# Patient Record
Sex: FEMALE | URBAN - METROPOLITAN AREA
[De-identification: names, ages, dates, MRNs, and addresses within clinical notes are randomized per-mention and may not be internally consistent; named-entity substitution may affect disease eponyms.]

---

## 2018-02-15 ENCOUNTER — APPOINTMENT (RX ONLY)
Dept: URBAN - METROPOLITAN AREA CLINIC 84 | Facility: CLINIC | Age: 83
Setting detail: DERMATOLOGY
End: 2018-02-15

## 2018-02-15 PROBLEM — L57.0 ACTINIC KERATOSIS: Status: ACTIVE | Noted: 2018-02-15

## 2018-02-15 PROBLEM — Z85.79 PERSONAL HISTORY OF OTHER MALIGNANT NEOPLASMS OF LYMPHOID, HEMATOPOIETIC AND RELATED TISSUES: Status: ACTIVE | Noted: 2018-02-15

## 2018-02-15 PROBLEM — C44.91 BASAL CELL CARCINOMA OF SKIN, UNSPECIFIED: Status: ACTIVE | Noted: 2018-02-15

## 2018-02-15 PROBLEM — D04.39 CARCINOMA IN SITU OF SKIN OF OTHER PARTS OF FACE: Status: ACTIVE | Noted: 2018-02-15

## 2018-02-15 PROBLEM — M12.9 ARTHROPATHY, UNSPECIFIED: Status: ACTIVE | Noted: 2018-02-15

## 2018-02-15 PROBLEM — E78.5 HYPERLIPIDEMIA, UNSPECIFIED: Status: ACTIVE | Noted: 2018-02-15

## 2018-02-15 PROBLEM — Z92.3 PERSONAL HISTORY OF IRRADIATION: Status: ACTIVE | Noted: 2018-02-15

## 2018-02-15 PROBLEM — Z85.828 PERSONAL HISTORY OF OTHER MALIGNANT NEOPLASM OF SKIN: Status: ACTIVE | Noted: 2018-02-15

## 2018-02-15 PROBLEM — H91.90 UNSPECIFIED HEARING LOSS, UNSPECIFIED EAR: Status: ACTIVE | Noted: 2018-02-15

## 2018-02-15 PROCEDURE — ? DEFER

## 2018-02-15 PROCEDURE — 99213 OFFICE O/P EST LOW 20 MIN: CPT

## 2018-03-07 ENCOUNTER — APPOINTMENT (RX ONLY)
Dept: URBAN - METROPOLITAN AREA CLINIC 84 | Facility: CLINIC | Age: 83
Setting detail: DERMATOLOGY
End: 2018-03-07

## 2018-03-07 PROBLEM — D48.5 NEOPLASM OF UNCERTAIN BEHAVIOR OF SKIN: Status: ACTIVE | Noted: 2018-03-07

## 2018-03-07 PROCEDURE — 11100: CPT

## 2018-03-07 PROCEDURE — ? BIOPSY BY SHAVE METHOD

## 2018-03-26 ENCOUNTER — APPOINTMENT (RX ONLY)
Dept: URBAN - METROPOLITAN AREA CLINIC 84 | Facility: CLINIC | Age: 83
Setting detail: DERMATOLOGY
End: 2018-03-26

## 2018-03-26 VITALS — HEART RATE: 64 BPM | DIASTOLIC BLOOD PRESSURE: 87 MMHG | SYSTOLIC BLOOD PRESSURE: 132 MMHG

## 2018-03-26 PROBLEM — D04.39 CARCINOMA IN SITU OF SKIN OF OTHER PARTS OF FACE: Status: ACTIVE | Noted: 2018-03-26

## 2018-03-26 PROCEDURE — ? REPAIR NOTE

## 2018-03-26 PROCEDURE — 14041 TIS TRNFR F/C/C/M/N/A/G/H/F: CPT

## 2018-03-26 PROCEDURE — ? MOHS SURGERY

## 2018-03-26 PROCEDURE — 17311 MOHS 1 STAGE H/N/HF/G: CPT

## 2018-03-26 NOTE — PROCEDURE: MOHS SURGERY
Referred To Mid-Level For Closure Text (Leave Blank If You Do Not Want): After obtaining clear surgical margins the patient was sent to Jama Patel PA-C for surgical repair.  The patient understands they will receive post-surgical care and follow-up from the mid-level provider.

## 2018-03-26 NOTE — PROCEDURE: MOHS SURGERY
Body Location Override (Optional - Billing Will Still Be Based On Selected Body Map Location If Applicable): right mandible

## 2018-03-26 NOTE — HPI: PROCEDURE (MOHS)
Has The Growth Been Previously Biopsied?: has been previously biopsied
Body Location Override (Optional): Right mandible

## 2018-03-26 NOTE — PROCEDURE: MOHS SURGERY
Referred To Plastics For Closure Text (Leave Blank If You Do Not Want): After obtaining clear surgical margins the patient was sent to plastics for surgical repair.  The patient understands they will receive post-surgical care and follow-up from Dr. Arias.

## 2018-04-09 ENCOUNTER — APPOINTMENT (RX ONLY)
Dept: URBAN - METROPOLITAN AREA CLINIC 84 | Facility: CLINIC | Age: 83
Setting detail: DERMATOLOGY
End: 2018-04-09

## 2018-04-09 VITALS — DIASTOLIC BLOOD PRESSURE: 88 MMHG | SYSTOLIC BLOOD PRESSURE: 139 MMHG | HEART RATE: 87 BPM

## 2018-04-09 PROBLEM — D04.39 CARCINOMA IN SITU OF SKIN OF OTHER PARTS OF FACE: Status: ACTIVE | Noted: 2018-04-09

## 2018-04-09 PROCEDURE — 17311 MOHS 1 STAGE H/N/HF/G: CPT | Mod: 79

## 2018-04-09 PROCEDURE — ? MOHS SURGERY

## 2018-04-09 PROCEDURE — ? REPAIR NOTE

## 2018-04-09 PROCEDURE — 14040 TIS TRNFR F/C/C/M/N/A/G/H/F: CPT | Mod: 79

## 2018-04-11 ENCOUNTER — APPOINTMENT (RX ONLY)
Dept: URBAN - METROPOLITAN AREA CLINIC 84 | Facility: CLINIC | Age: 83
Setting detail: DERMATOLOGY
End: 2018-04-11

## 2018-04-11 DIAGNOSIS — Z48.817 ENCOUNTER FOR SURGICAL AFTERCARE FOLLOWING SURGERY ON THE SKIN AND SUBCUTANEOUS TISSUE: ICD-10-CM

## 2018-04-11 PROCEDURE — ? POST-OP WOUND CHECK

## 2018-04-11 PROCEDURE — 99024 POSTOP FOLLOW-UP VISIT: CPT

## 2018-04-11 ASSESSMENT — LOCATION SIMPLE DESCRIPTION DERM: LOCATION SIMPLE: LEFT ZYGOMA

## 2018-04-11 ASSESSMENT — LOCATION DETAILED DESCRIPTION DERM: LOCATION DETAILED: LEFT CENTRAL ZYGOMA

## 2018-04-11 ASSESSMENT — LOCATION ZONE DERM: LOCATION ZONE: FACE

## 2018-04-11 NOTE — PROCEDURE: POST-OP WOUND CHECK
Add 34805 Cpt? (Important Note: In 2017 The Use Of 31277 Is Being Tracked By Cms To Determine Future Global Period Reimbursement For Global Periods): yes
Detail Level: Detailed
Body Location Override (Optional - Billing Will Still Be Based On Selected Body Map Location If Applicable): left temple

## 2018-09-25 ENCOUNTER — APPOINTMENT (RX ONLY)
Dept: URBAN - METROPOLITAN AREA CLINIC 84 | Facility: CLINIC | Age: 83
Setting detail: DERMATOLOGY
End: 2018-09-25

## 2018-09-25 DIAGNOSIS — Z85.828 PERSONAL HISTORY OF OTHER MALIGNANT NEOPLASM OF SKIN: ICD-10-CM

## 2018-09-25 DIAGNOSIS — D485 NEOPLASM OF UNCERTAIN BEHAVIOR OF SKIN: ICD-10-CM

## 2018-09-25 PROBLEM — D48.5 NEOPLASM OF UNCERTAIN BEHAVIOR OF SKIN: Status: ACTIVE | Noted: 2018-09-25

## 2018-09-25 PROCEDURE — ? BIOPSY BY SHAVE METHOD

## 2018-09-25 PROCEDURE — ? PHOTO-DOCUMENTATION

## 2018-09-25 PROCEDURE — ? COUNSELING

## 2018-09-25 PROCEDURE — 11100: CPT

## 2018-09-25 PROCEDURE — 99213 OFFICE O/P EST LOW 20 MIN: CPT | Mod: 25

## 2018-09-25 ASSESSMENT — LOCATION DETAILED DESCRIPTION DERM: LOCATION DETAILED: NASAL TIP

## 2018-09-25 ASSESSMENT — LOCATION SIMPLE DESCRIPTION DERM: LOCATION SIMPLE: NOSE

## 2018-09-25 ASSESSMENT — LOCATION ZONE DERM: LOCATION ZONE: NOSE

## 2018-09-25 NOTE — PROCEDURE: BIOPSY BY SHAVE METHOD
Notification Instructions: Patient will be notified of biopsy results if action is need to be taken. OK to call the office in 2 weeks if patient desires confirmation.
Consent: Verbal consent was obtained and risks were reviewed including but not limited to scarring, infection, bleeding, and scabbing.
Depth Of Biopsy: dermis
Hemostasis: Drysol
Post-Care Instructions: I reviewed with the patient in detail post-care instructions. Patient is to keep the biopsy site dry overnight, and then apply bacitracin twice daily until healed. Patient may apply hydrogen peroxide soaks to remove any crusting.
Type Of Destruction Used: Curettage
Additional Anesthesia Volume In Cc (Will Not Render If 0): 0
Destruction After The Procedure: No
Wound Care: Petrolatum
Anesthesia Type: 1% lidocaine with epinephrine
Body Location Override (Optional - Billing Will Still Be Based On Selected Body Map Location If Applicable): nasal tip
Was A Bandage Applied: Yes
Dressing: Band-Aid
Silver Nitrate Text: The wound bed was treated with silver nitrate after the biopsy was performed.
Anesthesia Volume In Cc (Will Not Render If 0): 0.5
Electrodesiccation Text: The wound bed was treated with electrodesiccation after the biopsy was performed.
Anticipated Plan (Based On Presumed Biopsy Results): Mohs if positive
Size Of Lesion In Cm: 1.4
Curettage Text: The wound bed was treated with curettage after the biopsy was performed.
Electrodesiccation And Curettage Text: The wound bed was treated with electrodesiccation and curettage after the biopsy was performed.
X Size Of Lesion In Cm: 0.7
Biopsy Method: Personna blade
Detail Level: Detailed
Billing Type: Third-Party Bill
Cryotherapy Text: The wound bed was treated with cryotherapy after the biopsy was performed.
Biopsy Type: H and E

## 2018-11-12 ENCOUNTER — APPOINTMENT (RX ONLY)
Dept: URBAN - METROPOLITAN AREA CLINIC 84 | Facility: CLINIC | Age: 83
Setting detail: DERMATOLOGY
End: 2018-11-12

## 2018-11-12 DIAGNOSIS — Z48.817 ENCOUNTER FOR SURGICAL AFTERCARE FOLLOWING SURGERY ON THE SKIN AND SUBCUTANEOUS TISSUE: ICD-10-CM

## 2018-11-12 PROCEDURE — ? POST-OP WOUND CHECK

## 2018-11-12 PROCEDURE — 99024 POSTOP FOLLOW-UP VISIT: CPT

## 2018-11-12 ASSESSMENT — LOCATION SIMPLE DESCRIPTION DERM: LOCATION SIMPLE: NOSE

## 2018-11-12 ASSESSMENT — LOCATION ZONE DERM: LOCATION ZONE: NOSE

## 2018-11-12 ASSESSMENT — LOCATION DETAILED DESCRIPTION DERM: LOCATION DETAILED: NASAL TIP

## 2018-11-12 NOTE — PROCEDURE: POST-OP WOUND CHECK
Add 35207 Cpt? (Important Note: In 2017 The Use Of 27592 Is Being Tracked By Cms To Determine Future Global Period Reimbursement For Global Periods): yes
Body Location Override (Optional - Billing Will Still Be Based On Selected Body Map Location If Applicable): nasal tip
Wound Evaluated By: Stephanie
Detail Level: Detailed

## 2018-12-03 ENCOUNTER — APPOINTMENT (RX ONLY)
Dept: URBAN - METROPOLITAN AREA CLINIC 84 | Facility: CLINIC | Age: 83
Setting detail: DERMATOLOGY
End: 2018-12-03

## 2018-12-03 VITALS — HEART RATE: 82 BPM | DIASTOLIC BLOOD PRESSURE: 78 MMHG | SYSTOLIC BLOOD PRESSURE: 149 MMHG

## 2018-12-03 PROBLEM — C44.311 BASAL CELL CARCINOMA OF SKIN OF NOSE: Status: ACTIVE | Noted: 2018-12-03

## 2018-12-03 PROCEDURE — 17312 MOHS ADDL STAGE: CPT

## 2018-12-03 PROCEDURE — 21235 EAR CARTILAGE GRAFT: CPT

## 2018-12-03 PROCEDURE — ? REPAIR NOTE

## 2018-12-03 PROCEDURE — 17311 MOHS 1 STAGE H/N/HF/G: CPT

## 2018-12-03 PROCEDURE — ? MOHS SURGERY

## 2018-12-10 ENCOUNTER — APPOINTMENT (RX ONLY)
Dept: URBAN - METROPOLITAN AREA CLINIC 84 | Facility: CLINIC | Age: 83
Setting detail: DERMATOLOGY
End: 2018-12-10

## 2018-12-10 DIAGNOSIS — Z48.02 ENCOUNTER FOR REMOVAL OF SUTURES: ICD-10-CM

## 2018-12-10 PROCEDURE — ? SUTURE REMOVAL (GLOBAL PERIOD)

## 2018-12-10 PROCEDURE — 99024 POSTOP FOLLOW-UP VISIT: CPT

## 2018-12-10 ASSESSMENT — LOCATION SIMPLE DESCRIPTION DERM: LOCATION SIMPLE: NOSE

## 2018-12-10 ASSESSMENT — LOCATION DETAILED DESCRIPTION DERM: LOCATION DETAILED: NASAL TIP

## 2018-12-10 ASSESSMENT — LOCATION ZONE DERM: LOCATION ZONE: NOSE

## 2018-12-10 NOTE — PROCEDURE: SUTURE REMOVAL (GLOBAL PERIOD)
Detail Level: Detailed
Add 09541 Cpt? (Important Note: In 2017 The Use Of 89614 Is Being Tracked By Cms To Determine Future Global Period Reimbursement For Global Periods): yes
Body Location Override (Optional - Billing Will Still Be Based On Selected Body Map Location If Applicable): nasal tip

## 2018-12-27 ENCOUNTER — APPOINTMENT (RX ONLY)
Dept: URBAN - METROPOLITAN AREA CLINIC 84 | Facility: CLINIC | Age: 83
Setting detail: DERMATOLOGY
End: 2018-12-27

## 2018-12-27 DIAGNOSIS — Z48.817 ENCOUNTER FOR SURGICAL AFTERCARE FOLLOWING SURGERY ON THE SKIN AND SUBCUTANEOUS TISSUE: ICD-10-CM

## 2018-12-27 PROCEDURE — ? POST-OP WOUND CHECK (NO GLOBAL PERIOD)

## 2018-12-27 PROCEDURE — 99212 OFFICE O/P EST SF 10 MIN: CPT | Mod: 24

## 2018-12-27 ASSESSMENT — LOCATION DETAILED DESCRIPTION DERM: LOCATION DETAILED: NASAL SUPRATIP

## 2018-12-27 ASSESSMENT — LOCATION ZONE DERM: LOCATION ZONE: NOSE

## 2018-12-27 ASSESSMENT — LOCATION SIMPLE DESCRIPTION DERM: LOCATION SIMPLE: NOSE

## 2018-12-27 NOTE — PROCEDURE: POST-OP WOUND CHECK (NO GLOBAL PERIOD)
Wound Assessment Override (Optional): excessive scabbing
Detail Level: Detailed
Wound Evaluated By: Stephanie huang

## 2019-01-07 ENCOUNTER — RX ONLY (OUTPATIENT)
Age: 84
Setting detail: RX ONLY
End: 2019-01-07

## 2019-01-07 ENCOUNTER — APPOINTMENT (RX ONLY)
Dept: URBAN - METROPOLITAN AREA CLINIC 84 | Facility: CLINIC | Age: 84
Setting detail: DERMATOLOGY
End: 2019-01-07

## 2019-01-07 DIAGNOSIS — Z48.817 ENCOUNTER FOR SURGICAL AFTERCARE FOLLOWING SURGERY ON THE SKIN AND SUBCUTANEOUS TISSUE: ICD-10-CM

## 2019-01-07 PROCEDURE — 99024 POSTOP FOLLOW-UP VISIT: CPT

## 2019-01-07 PROCEDURE — ? POST-OP WOUND EVALUATION

## 2019-01-07 RX ORDER — CALCIPOTRIENE 0.05 MG/ML
SOLUTION TOPICAL
Qty: 1 | Refills: 2 | Status: ERX

## 2019-01-07 ASSESSMENT — LOCATION SIMPLE DESCRIPTION DERM: LOCATION SIMPLE: NOSE

## 2019-01-07 ASSESSMENT — LOCATION DETAILED DESCRIPTION DERM: LOCATION DETAILED: NASAL SUPRATIP

## 2019-01-07 ASSESSMENT — LOCATION ZONE DERM: LOCATION ZONE: NOSE

## 2019-01-07 NOTE — PROCEDURE: POST-OP WOUND EVALUATION
Detail Level: Detailed
Add 04337 Cpt? (Important Note: In 2017 The Use Of 23302 Is Being Tracked By Cms To Determine Future Global Period Reimbursement For Global Periods): yes
Wound Diameter In Cm(Optional): 0
Wound Crusting?: crusted

## 2019-03-19 ENCOUNTER — APPOINTMENT (RX ONLY)
Dept: URBAN - METROPOLITAN AREA CLINIC 84 | Facility: CLINIC | Age: 84
Setting detail: DERMATOLOGY
End: 2019-03-19

## 2019-03-19 DIAGNOSIS — Z85.828 PERSONAL HISTORY OF OTHER MALIGNANT NEOPLASM OF SKIN: ICD-10-CM

## 2019-03-19 DIAGNOSIS — L82.1 OTHER SEBORRHEIC KERATOSIS: ICD-10-CM

## 2019-03-19 DIAGNOSIS — L57.0 ACTINIC KERATOSIS: ICD-10-CM

## 2019-03-19 DIAGNOSIS — D18.0 HEMANGIOMA: ICD-10-CM

## 2019-03-19 PROBLEM — D18.01 HEMANGIOMA OF SKIN AND SUBCUTANEOUS TISSUE: Status: ACTIVE | Noted: 2019-03-19

## 2019-03-19 PROCEDURE — ? LIQUID NITROGEN

## 2019-03-19 PROCEDURE — ? OBSERVATION

## 2019-03-19 PROCEDURE — 17000 DESTRUCT PREMALG LESION: CPT

## 2019-03-19 PROCEDURE — 99213 OFFICE O/P EST LOW 20 MIN: CPT | Mod: 25

## 2019-03-19 PROCEDURE — ? COUNSELING

## 2019-03-19 PROCEDURE — 17003 DESTRUCT PREMALG LES 2-14: CPT

## 2019-03-19 ASSESSMENT — LOCATION ZONE DERM
LOCATION ZONE: FACE
LOCATION ZONE: ARM
LOCATION ZONE: NOSE
LOCATION ZONE: TRUNK
LOCATION ZONE: NECK

## 2019-03-19 ASSESSMENT — LOCATION DETAILED DESCRIPTION DERM
LOCATION DETAILED: RIGHT CENTRAL ZYGOMA
LOCATION DETAILED: RIGHT LATERAL UPPER BACK
LOCATION DETAILED: RIGHT MEDIAL MALAR CHEEK
LOCATION DETAILED: LEFT LATERAL SUPERIOR CHEST
LOCATION DETAILED: LEFT POSTERIOR SHOULDER
LOCATION DETAILED: NASAL SUPRATIP
LOCATION DETAILED: RIGHT INFERIOR ANTERIOR NECK

## 2019-03-19 ASSESSMENT — LOCATION SIMPLE DESCRIPTION DERM
LOCATION SIMPLE: RIGHT CHEEK
LOCATION SIMPLE: RIGHT ANTERIOR NECK
LOCATION SIMPLE: RIGHT ZYGOMA
LOCATION SIMPLE: NOSE
LOCATION SIMPLE: RIGHT UPPER BACK
LOCATION SIMPLE: CHEST
LOCATION SIMPLE: LEFT SHOULDER

## 2019-04-08 ENCOUNTER — APPOINTMENT (RX ONLY)
Dept: URBAN - METROPOLITAN AREA CLINIC 84 | Facility: CLINIC | Age: 84
Setting detail: DERMATOLOGY
End: 2019-04-08

## 2019-04-08 DIAGNOSIS — Z48.817 ENCOUNTER FOR SURGICAL AFTERCARE FOLLOWING SURGERY ON THE SKIN AND SUBCUTANEOUS TISSUE: ICD-10-CM

## 2019-04-08 PROCEDURE — ? POST-OP WOUND EVALUATION

## 2019-04-08 PROCEDURE — 99024 POSTOP FOLLOW-UP VISIT: CPT

## 2019-04-08 ASSESSMENT — LOCATION ZONE DERM: LOCATION ZONE: NOSE

## 2019-04-08 ASSESSMENT — LOCATION DETAILED DESCRIPTION DERM: LOCATION DETAILED: NASAL SUPRATIP

## 2019-04-08 ASSESSMENT — LOCATION SIMPLE DESCRIPTION DERM: LOCATION SIMPLE: NOSE

## 2019-04-08 NOTE — PROCEDURE: POST-OP WOUND EVALUATION
Wound Color?: pink
Wound Crusting?: clean
Detail Level: Detailed
Add 40443 Cpt? (Important Note: In 2017 The Use Of 40673 Is Being Tracked By Cms To Determine Future Global Period Reimbursement For Global Periods): yes
Wound Diameter In Cm(Optional): 0
Wound Edema?: mild
Wound Evaluated By (Optional): Dr. Arias

## 2019-06-18 ENCOUNTER — APPOINTMENT (RX ONLY)
Dept: URBAN - METROPOLITAN AREA CLINIC 84 | Facility: CLINIC | Age: 84
Setting detail: DERMATOLOGY
End: 2019-06-18

## 2019-06-18 DIAGNOSIS — L57.0 ACTINIC KERATOSIS: ICD-10-CM

## 2019-06-18 DIAGNOSIS — L82.1 OTHER SEBORRHEIC KERATOSIS: ICD-10-CM

## 2019-06-18 DIAGNOSIS — Z85.828 PERSONAL HISTORY OF OTHER MALIGNANT NEOPLASM OF SKIN: ICD-10-CM

## 2019-06-18 DIAGNOSIS — L57.8 OTHER SKIN CHANGES DUE TO CHRONIC EXPOSURE TO NONIONIZING RADIATION: ICD-10-CM

## 2019-06-18 PROCEDURE — ? LIQUID NITROGEN

## 2019-06-18 PROCEDURE — 17003 DESTRUCT PREMALG LES 2-14: CPT

## 2019-06-18 PROCEDURE — 99213 OFFICE O/P EST LOW 20 MIN: CPT | Mod: 25

## 2019-06-18 PROCEDURE — ? ADDITIONAL NOTES

## 2019-06-18 PROCEDURE — 17000 DESTRUCT PREMALG LESION: CPT

## 2019-06-18 PROCEDURE — ? COUNSELING

## 2019-06-18 ASSESSMENT — LOCATION SIMPLE DESCRIPTION DERM
LOCATION SIMPLE: RIGHT CHEEK
LOCATION SIMPLE: LEFT ZYGOMA
LOCATION SIMPLE: LEFT CHEEK
LOCATION SIMPLE: NOSE

## 2019-06-18 ASSESSMENT — LOCATION DETAILED DESCRIPTION DERM
LOCATION DETAILED: LEFT INFERIOR CENTRAL MALAR CHEEK
LOCATION DETAILED: NASAL TIP
LOCATION DETAILED: RIGHT INFERIOR CENTRAL MALAR CHEEK
LOCATION DETAILED: LEFT LATERAL ZYGOMA
LOCATION DETAILED: LEFT CENTRAL MALAR CHEEK

## 2019-06-18 ASSESSMENT — LOCATION ZONE DERM
LOCATION ZONE: FACE
LOCATION ZONE: NOSE

## 2019-06-18 NOTE — PROCEDURE: LIQUID NITROGEN
Render Note In Bullet Format When Appropriate: No
Duration Of Freeze Thaw-Cycle (Seconds): 0
Post-Care Instructions: I reviewed with the patient in detail post-care instructions. Patient is to wear sunprotection, and avoid picking at any of the treated lesions. Pt may apply Vaseline to crusted or scabbing areas.
Number Of Freeze-Thaw Cycles: 1 freeze-thaw cycle
Detail Level: Simple
Consent: The patient's consent was obtained including but not limited to risks of crusting, scabbing, blistering, scarring, darker or lighter pigmentary change, recurrence, incomplete removal and infection.

## 2019-06-18 NOTE — PROCEDURE: ADDITIONAL NOTES
Detail Level: Simple
Additional Notes: Advised patient that the scar takes several months to fully contract and remodel (9 months stated, patient's  states he was told 6 months previously). Advised that she wound appears to be healing well and no signs of recurrence of cancer or infection is present.

## 2019-09-24 ENCOUNTER — RX ONLY (OUTPATIENT)
Age: 84
Setting detail: RX ONLY
End: 2019-09-24

## 2019-09-24 RX ORDER — CICLOPIROX 10 MG/.96ML
SHAMPOO TOPICAL
Qty: 1 | Refills: 0 | Status: ERX

## 2020-01-13 ENCOUNTER — APPOINTMENT (RX ONLY)
Dept: URBAN - METROPOLITAN AREA CLINIC 84 | Facility: CLINIC | Age: 85
Setting detail: DERMATOLOGY
End: 2020-01-13

## 2020-01-13 DIAGNOSIS — Z85.828 PERSONAL HISTORY OF OTHER MALIGNANT NEOPLASM OF SKIN: ICD-10-CM

## 2020-01-13 PROCEDURE — 99212 OFFICE O/P EST SF 10 MIN: CPT

## 2020-01-13 PROCEDURE — ? COUNSELING

## 2020-01-13 ASSESSMENT — LOCATION SIMPLE DESCRIPTION DERM: LOCATION SIMPLE: NOSE

## 2020-01-13 ASSESSMENT — LOCATION ZONE DERM: LOCATION ZONE: NOSE

## 2020-01-13 ASSESSMENT — LOCATION DETAILED DESCRIPTION DERM: LOCATION DETAILED: NASAL TIP

## 2020-05-21 ENCOUNTER — APPOINTMENT (RX ONLY)
Dept: URBAN - METROPOLITAN AREA CLINIC 84 | Facility: CLINIC | Age: 85
Setting detail: DERMATOLOGY
End: 2020-05-21

## 2020-05-21 VITALS — TEMPERATURE: 98.3 F

## 2020-05-21 DIAGNOSIS — Z12.83 ENCOUNTER FOR SCREENING FOR MALIGNANT NEOPLASM OF SKIN: ICD-10-CM

## 2020-05-21 DIAGNOSIS — Z85.828 PERSONAL HISTORY OF OTHER MALIGNANT NEOPLASM OF SKIN: ICD-10-CM

## 2020-05-21 DIAGNOSIS — L82.1 OTHER SEBORRHEIC KERATOSIS: ICD-10-CM

## 2020-05-21 PROCEDURE — 99213 OFFICE O/P EST LOW 20 MIN: CPT

## 2020-05-21 PROCEDURE — ? FULL BODY SKIN EXAM - DECLINED

## 2020-05-21 PROCEDURE — ? ADDITIONAL NOTES

## 2020-05-21 PROCEDURE — ? COUNSELING

## 2020-05-21 ASSESSMENT — LOCATION SIMPLE DESCRIPTION DERM: LOCATION SIMPLE: RIGHT NOSE

## 2020-05-21 ASSESSMENT — LOCATION ZONE DERM: LOCATION ZONE: NOSE

## 2020-05-21 ASSESSMENT — LOCATION DETAILED DESCRIPTION DERM: LOCATION DETAILED: RIGHT NASAL ALA

## 2020-09-28 ENCOUNTER — APPOINTMENT (RX ONLY)
Dept: URBAN - METROPOLITAN AREA CLINIC 84 | Facility: CLINIC | Age: 85
Setting detail: DERMATOLOGY
End: 2020-09-28

## 2020-09-28 DIAGNOSIS — Z48.817 ENCOUNTER FOR SURGICAL AFTERCARE FOLLOWING SURGERY ON THE SKIN AND SUBCUTANEOUS TISSUE: ICD-10-CM

## 2020-09-28 PROCEDURE — ? COUNSELING

## 2020-09-28 PROCEDURE — 99212 OFFICE O/P EST SF 10 MIN: CPT

## 2020-09-28 NOTE — PROCEDURE: COUNSELING
Patient Specific Counseling (Will Not Stick From Patient To Patient): No risk to the nose to collapse.  recommendation to do a forehead flap for a better Apparent and functional. But the patient dosent feel that the forehead flap is to much.
Detail Level: Detailed

## 2020-10-29 ENCOUNTER — APPOINTMENT (RX ONLY)
Dept: URBAN - METROPOLITAN AREA CLINIC 84 | Facility: CLINIC | Age: 85
Setting detail: DERMATOLOGY
End: 2020-10-29

## 2020-10-29 VITALS — TEMPERATURE: 98.4 F

## 2020-10-29 DIAGNOSIS — D485 NEOPLASM OF UNCERTAIN BEHAVIOR OF SKIN: ICD-10-CM

## 2020-10-29 DIAGNOSIS — Z85.828 PERSONAL HISTORY OF OTHER MALIGNANT NEOPLASM OF SKIN: ICD-10-CM

## 2020-10-29 PROBLEM — D48.5 NEOPLASM OF UNCERTAIN BEHAVIOR OF SKIN: Status: ACTIVE | Noted: 2020-10-29

## 2020-10-29 PROCEDURE — 11102 TANGNTL BX SKIN SINGLE LES: CPT

## 2020-10-29 PROCEDURE — 99213 OFFICE O/P EST LOW 20 MIN: CPT | Mod: 25

## 2020-10-29 PROCEDURE — ? BIOPSY BY SHAVE METHOD

## 2020-10-29 PROCEDURE — ? COUNSELING

## 2020-10-29 PROCEDURE — ? PHOTO-DOCUMENTATION

## 2020-10-29 ASSESSMENT — LOCATION ZONE DERM: LOCATION ZONE: TRUNK

## 2020-10-29 ASSESSMENT — LOCATION DETAILED DESCRIPTION DERM: LOCATION DETAILED: UPPER STERNUM

## 2020-10-29 ASSESSMENT — LOCATION SIMPLE DESCRIPTION DERM: LOCATION SIMPLE: CHEST

## 2020-10-29 NOTE — PROCEDURE: BIOPSY BY SHAVE METHOD
Detail Level: Detailed
Depth Of Biopsy: dermis
Was A Bandage Applied: Yes
Size Of Lesion In Cm: 0
Biopsy Type: H and E
Biopsy Method: Personna blade
Anesthesia Type: 1% lidocaine with epinephrine
Anesthesia Volume In Cc (Will Not Render If 0): 1
Hemostasis: Drysol
Wound Care: Petrolatum
Dressing: bandage
Destruction After The Procedure: No
Type Of Destruction Used: Curettage
Curettage Text: The wound bed was treated with curettage after the biopsy was performed.
Cryotherapy Text: The wound bed was treated with cryotherapy after the biopsy was performed.
Electrodesiccation Text: The wound bed was treated with electrodesiccation after the biopsy was performed.
Electrodesiccation And Curettage Text: The wound bed was treated with electrodesiccation and curettage after the biopsy was performed.
Silver Nitrate Text: The wound bed was treated with silver nitrate after the biopsy was performed.
Lab: 6
Consent: Written consent was obtained and risks were reviewed including but not limited to scarring, infection, bleeding, scabbing, incomplete removal, nerve damage and allergy to anesthesia.
Post-Care Instructions: I reviewed with the patient in detail post-care instructions. Patient is to keep the biopsy site dry overnight, and then apply bacitracin twice daily until healed. Patient may apply hydrogen peroxide soaks to remove any crusting.
Notification Instructions: Patient will be notified of biopsy results. However, patient instructed to call the office if not contacted within 2 weeks.
Billing Type: Third-Party Bill
Information: Selecting Yes will display possible errors in your note based on the variables you have selected. This validation is only offered as a suggestion for you. PLEASE NOTE THAT THE VALIDATION TEXT WILL BE REMOVED WHEN YOU FINALIZE YOUR NOTE. IF YOU WANT TO FAX A PRELIMINARY NOTE YOU WILL NEED TO TOGGLE THIS TO 'NO' IF YOU DO NOT WANT IT IN YOUR FAXED NOTE.

## 2020-11-19 ENCOUNTER — APPOINTMENT (RX ONLY)
Dept: URBAN - METROPOLITAN AREA CLINIC 84 | Facility: CLINIC | Age: 85
Setting detail: DERMATOLOGY
End: 2020-11-19

## 2020-11-19 DIAGNOSIS — Z85.828 PERSONAL HISTORY OF OTHER MALIGNANT NEOPLASM OF SKIN: ICD-10-CM

## 2020-11-19 DIAGNOSIS — T07XXXA ABRASION OR FRICTION BURN OF OTHER, MULTIPLE, AND UNSPECIFIED SITES, WITHOUT MENTION OF INFECTION: ICD-10-CM

## 2020-11-19 PROBLEM — D04.5 CARCINOMA IN SITU OF SKIN OF TRUNK: Status: ACTIVE | Noted: 2020-11-19

## 2020-11-19 PROBLEM — S80.922A UNSPECIFIED SUPERFICIAL INJURY OF LEFT LOWER LEG, INITIAL ENCOUNTER: Status: ACTIVE | Noted: 2020-11-19

## 2020-11-19 PROCEDURE — 17262 DSTRJ MAL LES T/A/L 1.1-2.0: CPT

## 2020-11-19 PROCEDURE — 99213 OFFICE O/P EST LOW 20 MIN: CPT | Mod: 25

## 2020-11-19 PROCEDURE — ? COUNSELING

## 2020-11-19 PROCEDURE — ? CRYOSURGICAL DESTRUCTION

## 2020-11-19 ASSESSMENT — LOCATION ZONE DERM: LOCATION ZONE: LEG

## 2020-11-19 ASSESSMENT — LOCATION SIMPLE DESCRIPTION DERM: LOCATION SIMPLE: LEFT PRETIBIAL REGION

## 2020-11-19 ASSESSMENT — LOCATION DETAILED DESCRIPTION DERM: LOCATION DETAILED: LEFT DISTAL PRETIBIAL REGION

## 2020-11-19 NOTE — PROCEDURE: CRYOSURGICAL DESTRUCTION
Detail Level: Detailed
Size Of Lesion In Cm: 1.1
Add Intralesional Injection: No
Medication Injected: 5-Fluorouracil
Concentration (Mg/Ml Or Millions Of Plaque Forming Units/Cc): 0.01
Total Volume (Ccs): 1
Anesthesia Volume In Cc: 0
Number Of Freeze-Thaw Cycles: 2 freeze-thaw cycles
Total Time In Minutes: 2 minutes
Additional Information: (Optional): The wound was cleaned, and a dressing was applied.  The patient received detailed post-op instructions.
Pre-Procedure: The surgical site was antiseptically prepared.
Consent was obtained from the patient. The risks and benefits to therapy were discussed in detail. Specifically, the risks of infection, scarring, bleeding, prolonged wound healing, incomplete removal, allergy to anesthesia, nerve injury and recurrence were addressed. Alternatives to liquid nitrogen, such as ED&C, surgical removal, XRT were also discussed.  Prior to the procedure, the treatment site was clearly identified and confirmed by the patient. All components of Universal Protocol/PAUSE Rule completed.
Render Post-Care In The Note: Yes
Post-Care Instructions: I reviewed with the patient in detail post-care instructions. Patient is to keep the area dry for 48 hours, and not to engage in any heavy lifting, exercise, or swimming for the next 14 days. Should the patient develop any fevers, chills, bleeding, severe pain patient will contact the office immediately.
Bill As A Line Item Or As Units: Line Item

## 2021-08-09 NOTE — PROCEDURE: MOHS SURGERY
Outreach attempt was made to schedule a Medicare Wellness Visit. This was the first attempt. Contact was made, [facility] appointment refused.   Double M-Plasty Intermediate Repair Preamble Text (Leave Blank If You Do Not Want): Undermining was performed with blunt dissection.

## 2023-01-06 NOTE — PROCEDURE: REPAIR NOTE
Never Composite Graft Text: The defect edges were debeveled with a #15 scalpel blade.  Given the location of the defect, shape of the defect, the proximity to free margins and the fact the defect was full thickness a composite graft was deemed most appropriate.  The defect was outline and then transferred to the donor site.  A full thickness graft was then excised from the donor site. The graft was then placed in the primary defect, oriented appropriately and then sutured into place.  The secondary defect was then repaired using a primary closure.

## 2023-05-08 NOTE — PROCEDURE: REPAIR NOTE
Orthopedic Office Note  [address]  [address] [facility], [address]  [facility] [address]      CHIEF COMPLAINT:    Chief Complaint   Patient presents with    Pain     RE CK PELVIS       HISTORY OF PRESENT ILLNESS:      The patient is a 18 y.o. female  who presents today for follow-up of her right ASIS avulsion fracture. She reports she had been using crutches and been doing quite well and then went to [facility] this weekend and exacerbated her symptoms. She now has pain with walking. Past Medical History:    Past Medical History:   Diagnosis Date    Allergic rhinitis     POTS (postural orthostatic tachycardia syndrome)     Urinary tract infection        Past Surgical History:    No past surgical history on file. Medications Prior to Admission:   Current Outpatient Medications   Medication Sig Dispense Refill    ALTAVERA 0.15-30 MG-MCG per tablet Take 1 tablet by mouth daily      topiramate (TOPAMAX) 25 MG tablet Take 1 tablet by mouth in the morning and at bedtime      citalopram (CELEXA) 20 MG tablet Take 1 tablet by mouth daily      naproxen (NAPROSYN) 500 MG tablet Take 1 tablet by mouth daily      ferrous sulfate (IRON 325) 325 (65 Fe) MG tablet Ferrous Sulfate Active 325 MG PO DAILY August 5th, 2022 12:00am      ondansetron (ZOFRAN-ODT) 4 MG disintegrating tablet Take 1 tablet by mouth 3 times daily as needed for Nausea or Vomiting 21 tablet 0    albuterol sulfate HFA (PROVENTIL;VENTOLIN;PROAIR) 108 (90 Base) MCG/ACT inhaler Inhale into the lungs      SYMBICORT 80-4.5 MCG/ACT AERO INHALE 1 PUFF BY MOUTH TWICE DAILY      montelukast (SINGULAIR) 10 MG tablet        No current facility-administered medications for this visit.        Allergies:  Latex and Other    Social History:   Social History     Tobacco Use   Smoking Status Never    Passive exposure: Yes   Smokeless Tobacco Never   Tobacco Referred To Asc For Closure Text (Leave Blank If You Do Not Want): After obtaining clear surgical margins the patient was sent to an ASC for surgical repair.  The patient understands they will receive post-surgical care and follow-up from the ASC physician.

## 2024-07-15 NOTE — PROCEDURE: REPAIR NOTE
Patient has appointment   Cheiloplasty (Complex) Text: A decision was made to reconstruct the defect with a  cheiloplasty.  The defect was undermined extensively.  Additional obicularis oris muscle was excised with a 15 blade scalpel.  The defect was converted into a full thickness wedge to facilite a better cosmetic result.  Small vessels were then tied off with 5-0 monocyrl. The obicularis oris, superficial fascia, adipose and dermis were then reapproximated.  After the deeper layers were approximated the epidermis was reapproximated with particular care given to realign the vermilion border.

## 2025-01-30 NOTE — PROCEDURE: REPAIR NOTE
24 Hr BP machine:   Kindly call [phone] to schedule an appointment.        Please call or email me via [employer] [facility]  to discuss your RESULTS in 1 week from you turning in to discuss the RESULTS.     If you've had a favorable clinic experience today, please complete your SURVEY when asked.  If you have not had a favorable experience, please share with me  and we'll work on improving our comprehensive customer service.      It is our [place] and [place] to to work with you as your physician, advocate and care team towards your Comprehensive Health.      With Gratitude, Optimism and Ramona,    Dr. Baker and staff           Please call or email me via [employer] [facility]  to discuss your RESULTS on Tuesday unless requested to do otherwise.    Our office will discuss your results that week unless there are unusual circumstances.        If you've had a favorable clinic experience today, please complete your SURVEY when asked.  If you have not had a favorable experience, please share with me  and we'll work on improving our comprehensive customer service.      It is our [place] and [place] to to work with you as your physician, advocate and care team towards your Comprehensive Health.      With Gratitude, Optimism and Ramona,    Dr. Baekr and staff    My Facebook Health and Wellness Page:   [facility]      **REFERRAL SPECIALIST:  When given a referral, please CALL Wendy Ellis:  [phone].      Multivitamin/Adequate Vitamin D3 2000 International Units daily and Calcium 500 mg twice daily (dietary or supplement).  Starting at 40 (unless otherwise discussed w/ me), Yearly Mammogram:  Please call [phone] to schedule.  Dental visit every 6 months or as directed.  Optometry every 2 years or as directed.  Healthy eating and at least 150 mins of weekly Exercise.  Seatbelts always.   Condoms always as applicable:  Single/any risk for sexually transmitted infections.  Colonoscopy due 2031  Bone  Density due at 67 :  Please call [phone] to schedule.          Medication Cost Resource:    Call [facility]:          FREE LIST OF MEDS:  Inquire.         $25 Gift card for each new script.     [employer] [facility]Sudiksha and [employer] Sadie:  A great way to save $ for prescription and over-the-counter medicines.   Consider these pharmacies:  [facility], [facility], [facility].    [facility]:  Can get free meds when No insurance/your insurance doesn't cover them.      **Go directly by Internet to the medication's specific pharmaceutical company:  May have the BEST discounts.          WEIGHT LOSS/GETTING FITTER:  ONE DAY AT A TIME RESOURCES:     BOOKS:     DR. PADGETT:    I'm So Effing Tired  I'm So Effing Hungry      6 WEEKS PLANT BASED DIET: 2 days a week:  2 cheat meats/week (FISH/CHICKEN/TURKEY)       Watch \"The Game Changer\" and  \"WHAT THE HEALTH\" on NETFLIX.      Please take vit D3 5000 international units daily and VITAMIN C 1000 mg daily for duration of Pandemic.         FB:  [facility] dondeEstaâ„¢ SADIE    Lose It Sadie    Omega Parks:  The Best Life Diet  Keri Frey:  The Me I Knew I Could Be   *[employer] Medical.gov  Weight Watchers: On-Line and Meeting Options  My Fitness Pal      Loose weight/exercise:     Consider:  KETO DIET ( but with HEALTHY \"GOOD FATS\")  for 2-3 months then Mediterranean diet indefinitely for your comprehensive health.     Real Appeal: On-line Nutrition Fitness Coaching:  FREE      LIMIT CARBOHYDRATES to 40-60 grams/meal (including what you eat and drink)    Increase PROTEIN to 20-30 grams/meal.    Increase MONOUNSATURATED FATTY ACIDS called \"GOOD FATS\" to your meals at least 3 times a day: Examples include a handful of nuts, salmon, tuna, macro fish, dark chocolate, avocado or even Fish Oil Tablets 1 gram (only if not allergic).      Daily Workout FREE  Sadie    Lose It Sadie    My Fitness pal Sadie     [employer] Medical.gov Smart Tracker      Healthy eating and at least 150 mins of weekly exercise.    As  applicable:   6 week weight loss goal:6#    12 week weight loss goal:12#    6 month weight loss goal: 24#          Patient Instructions/Information    TIPS FOR WEIGHT CONTROL    1.  EXERCISE MORE!  At least 3x/week for a minimum of half an hour each time.  This could include walking, biking, jogging, aerobics, swimming, basketball, or any other \"aerobic\" activity.  This not only helps to burn-off calories, but it also helps you look and feel better!    2.  EAT 3 MEALS/DAY - Do Not Skip Meals! Skipping meals may lead to excess hunger at the next meal.  It also leads to bingeing or \"pigging-out\"!    3.  PACK A LUNCH instead of going out for lunch or eating in the cafeteria.  Eating out often means eating foods higher in fat.  Packing a lunch will help you stay in control.    4.  SNACKS - Avoid chips, cakes, cookies, ice cream, and candy.  Try fresh fruit, raw vegetables, or plain popcorn.  Drink diet soda, diet Jax-Aid or WATER.  Use 1% or skim milk.  Limit use of fruit juice as a beverage.    5.  KEEP A FOOD DIARY.  Record the type and amount of food you eat.    6.  STAY AWAY FROM FRIED FOODS such as french fries, fried chicken, potato chips, etc.    7.  WHEN GOING OUT FOR FAST FOOD, have a plain hamburger or salad with low-calorie dressing.  Milkshakes are high in calories.  Instead, have a diet soda or a carton of low-fat milk.    8.  PLAN TO LOSE WEIGHT SLOWLY! One to two pounds per week is considered average.  Weigh yourself only once each week.  Take your measurements at the beginning of your diet.  Sometimes you will lose inches without losing weight.    9.  IF POSSIBLE, WALK, JOG, OR RIDE YOUR BIKE to school, work, or the store instead of taking the car or bus.  Take the stairs instead of the elevator. Any extra activity helps.    10. PLAN MEALS THE DAY BEFORE so you know what you'll be eating.  If you are going to a party at night, cut back a little at breakfast and lunch.       Learning About Serving and  Portion Sizes  Servings and portions. What’s the difference? These terms can be very confusing. But learning to measure serving sizes can help you figure out how many carbohydrates (“carbs”) and other foods you eat each day. They are also powerful tools for managing your weight.  Servings and Portions     A good rule of thumb: Devote half your plate to vegetables and green salad. Split the other half between protein and starchy carbohydrates. Fruit makes a good dessert.   Many different words are used to describe amounts of food. If your healthcare provider uses a term you’re not sure of, don’t be afraid to ask. It helps to know the difference between servings and portions.  A serving size is a fixed size. Food producers use this term to describe their products. For example, the label on a cereal box could say that 1 cup of dry cereal = 1 serving.  A portion (also called a “helping”) is how much you eat or how much you put on your plate at a meal. For example, you might eat 2 cups of cereal at breakfast.  Using Serving Information  The portion you choose to eat (such as 2 cups of cereal) may be more than one serving as listed on the food label (such as 1 cup of cereal). That’s why it helps to measure or weigh the food you eat. Because the food label values are based on servings, you’ll need to know how many servings you eat at one sitting.     Ounces: 2 to 3 ounces is about the size of your palm.       1 Cup: 1 cup (or a medium-sized piece) is about the size of your fist.       1/2 Cup: 1/2 cup is about the size of your cupped hand.      Keeping Track of Serving Sizes  When you’re planning for a snack or a meal, keep servings in mind. If you don’t have measuring cups or a scale handy, there are ways to “eyeball” serving sizes.  Managing Portion Sizes  If your weight is a concern, reducing your portions can help. You can eat more than one serving of a food at once. But to keep from eating too much at one meal, learn  how to manage your portions. A portion is the amount of each type of food on your plate. See the plate diagram for an example of balanced portions.  © 0279-4028 [employer], [address] [facility], [place] [address]. All rights reserved. This information is not intended as a substitute for professional medical care. Always follow your healthcare professional's instructions.            OMRON:  Blood Pressure Management:     Continue with DASH:  See below.      Sodium <1500 mg daily    Ensure that you get Vitamin D3 2000 international units daily and Calcium 500 mg twice daily (Low fat dairy 3 servings daily (1% or skim).    Monitor blood pressure WEEKLY.  Call office for appt the next day for any top  or above or bottom  or above.    Goal BP in office <140/<90.    Coricidin HBP, cold medicine you can take when you are watching your blood pressure.    Avoid decongestants, like Sudafed and regular Anti-inflammatory medicines like Aleve/Naprosyn, Ibuprofen/Motrin, etc., as  they can raise your blood pressure.        DASH Eating Plan (Dietary Approaches to Stop Hypertension)   This guide has been prepared for your use by registered dietitians.  If you have questions or concerns, please call the nearest [place] facility to contact a dietitian.  Diet counseling is available to address your specific needs.     People with high blood pressure, and those in danger of getting high blood pressure, can benefit from the DASH (dietary approaches to stop hypertension) eating plan.  This plan follows   heart-healthy guidelines.  It limits unhealthy fats and sodium.  It focuses on foods with nutrients that can help lower blood pressure.  These include   potassium, calcium, magnesium, protein and fiber.   The DASH eating plan below promotes healthy eating and gradual weight loss.  It is based on:    1,600 calories for most women and inactive men    2,000 calories for most men and very active women    It is recommended  most people reduce sodium to less than 1,500 milligrams (mg) per day.  Younger, healthy people without risk for hypertension should reduce to 2,300 mg.   Who needs 1,500 milligrams (mg)?    People age 51 and older    People with high blood pressure or hypertension    People with diabetes    People with chronic kidney disease     Americans     To plan meals and your grocery shopping list, use the following chart:   High blood pressure can be controlled or prevented by taking these steps:    Focus food choices on fruits, vegetables,   low-fat dairy, lean poultry, fish or meats, nuts and whole grains    Choose foods lower in salt; read food labels    Maintain a healthy weight    Be moderately active most days    If you drink alcohol, do so moderately    Stop smoking    If you have high blood pressure and are prescribed pills, take as directed     Food group            Daily servings   1,600 calories 2,000 calories            Serving sizes            Examples and notes    Grains and cereals  6  7 to 8  1 slice bread   1 oz. dry cereal   1/2 cup cooked rice, pasta or cereal  Whole-wheat bread, English muffin, leona bread, bagel, cereal, grits, oatmeal, unsalted crackers, unsalted pretzels and unsalted popcorn    Vegetables  3 to 4  4 to 5  1 cup raw leafy vegetables   1/2 cup cooked   vegetables   6 oz. low-sodium vegetable juice  Tomatoes, potatoes, carrots, green peas, squash, broccoli, turnip greens, collards, kale, spinach, artichokes and green beans            DASH diet:  Limit salt to 1500mg daily.        WEIGHT LOSS/GETTING FITTER:  ONE DAY AT A TIME RESOURCES:     6 WEEKS PLANT BASED DIET: 2 days a week:  2 cheat meats/week (FISH/CHICKEN/TURKEY)       Watch \"The Game Changer\" and  \"WHAT THE HEALTH\" on NETFLIX.      Please take vit D3 5000 international units daily and VITAMIN C 1000 mg daily for duration of Pandemic.         FB: Dr. [facility]    SHEFALI SADIE    Lose It Sadie    Omega Parks:  The Best Life  Diet  Keri Frey:  The Me I Knew I Could Be   *[employer].gov  Weight Watchers: On-Line and Meeting Options  My Fitness Pal      Loose weight/exercise:     Consider:  KETO DIET ( but with HEALTHY \"GOOD FATS\")  for 2-3 months then Mediterranean diet indefinitely for your comprehensive health.     Real Appeal: On-line Nutrition Fitness Coaching:  FREE      LIMIT CARBOHYDRATES to 40-60 grams/meal (including what you eat and drink)    Increase PROTEIN to 20-30 grams/meal.    Increase MONOUNSATURATED FATTY ACIDS called \"GOOD FATS\" to your meals at least 3 times a day: Examples include a handful of nuts, salmon, tuna, macro fish, dark chocolate, avocado or even Fish Oil Tablets 1 gram (only if not allergic).      Daily Workout FREE  Sadie    Lose It Sadie    My Fitness pal Sadie     [employer] Smart Tracker      Healthy eating and at least 150 mins of weekly exercise.    As applicable:   6 week weight loss goal:6#    12 week weight loss goal:12#    6 month weight loss goal: 24#          Patient Instructions/Information    TIPS FOR WEIGHT CONTROL    1.  EXERCISE MORE!  At least 3x/week for a minimum of half an hour each time.  This could include walking, biking, jogging, aerobics, swimming, basketball, or any other \"aerobic\" activity.  This not only helps to burn-off calories, but it also helps you look and feel better!    2.  EAT 3 MEALS/DAY - Do Not Skip Meals! Skipping meals may lead to excess hunger at the next meal.  It also leads to bingeing or \"pigging-out\"!    3.  PACK A LUNCH instead of going out for lunch or eating in the cafeteria.  Eating out often means eating foods higher in fat.  Packing a lunch will help you stay in control.    4.  SNACKS - Avoid chips, cakes, cookies, ice cream, and candy.  Try fresh fruit, raw vegetables, or plain popcorn.  Drink diet soda, diet Jax-Aid or WATER.  Use 1% or skim milk.  Limit use of fruit juice as a beverage.    5.  KEEP A FOOD DIARY.  Record the type and amount  of food you eat.    6.  STAY AWAY FROM FRIED FOODS such as french fries, fried chicken, potato chips, etc.    7.  WHEN GOING OUT FOR FAST FOOD, have a plain hamburger or salad with low-calorie dressing.  Milkshakes are high in calories.  Instead, have a diet soda or a carton of low-fat milk.    8.  PLAN TO LOSE WEIGHT SLOWLY! One to two pounds per week is considered average.  Weigh yourself only once each week.  Take your measurements at the beginning of your diet.  Sometimes you will lose inches without losing weight.    9.  IF POSSIBLE, WALK, JOG, OR RIDE YOUR BIKE to school, work, or the store instead of taking the car or bus.  Take the stairs instead of the elevator. Any extra activity helps.    10. PLAN MEALS THE DAY BEFORE so you know what you'll be eating.  If you are going to a party at night, cut back a little at breakfast and lunch.       Learning About Serving and Portion Sizes  Servings and portions. What’s the difference? These terms can be very confusing. But learning to measure serving sizes can help you figure out how many carbohydrates (“carbs”) and other foods you eat each day. They are also powerful tools for managing your weight.  Servings and Portions     A good rule of thumb: Devote half your plate to vegetables and green salad. Split the other half between protein and starchy carbohydrates. Fruit makes a good dessert.   Many different words are used to describe amounts of food. If your healthcare provider uses a term you’re not sure of, don’t be afraid to ask. It helps to know the difference between servings and portions.  A serving size is a fixed size. Food producers use this term to describe their products. For example, the label on a cereal box could say that 1 cup of dry cereal = 1 serving.  A portion (also called a “helping”) is how much you eat or how much you put on your plate at a meal. For example, you might eat 2 cups of cereal at breakfast.  Using Serving Information  The portion  you choose to eat (such as 2 cups of cereal) may be more than one serving as listed on the food label (such as 1 cup of cereal). That’s why it helps to measure or weigh the food you eat. Because the food label values are based on servings, you’ll need to know how many servings you eat at one sitting.     Ounces: 2 to 3 ounces is about the size of your palm.       1 Cup: 1 cup (or a medium-sized piece) is about the size of your fist.       1/2 Cup: 1/2 cup is about the size of your cupped hand.      Keeping Track of Serving Sizes  When you’re planning for a snack or a meal, keep servings in mind. If you don’t have measuring cups or a scale handy, there are ways to “eyeball” serving sizes.  Managing Portion Sizes  If your weight is a concern, reducing your portions can help. You can eat more than one serving of a food at once. But to keep from eating too much at one meal, learn how to manage your portions. A portion is the amount of each type of food on your plate. See the plate diagram for an example of balanced portions.  © 2154-5675 [facility], [address] [place] [address], [place] [address]. All rights reserved. This information is not intended as a substitute for professional medical care. Always follow your healthcare professional's instructions.        Start Vitamin D 2000 International Units daily, 30-60 mins of daily exercise.    [facility]:  [phone]      [facility] [place]:   [phone]       [facility] Assessment Services in [place]:   [phone]      [facility]  Christal Juarez MA, [facility]  Counselor  [phone]          WHAT MUST I DO TO HELP MYSELF?  Exercise:  This is not optional anymore if you are serious about feeling better.  It is so important that it is now recommended by the National Institutes of Mental Health.  Strenuous exercise is not necessary.  Walking is excellent.  Start low and make a chart.  Walking outside is even better.  Even cold  cloudy days have more fresh air and sunlight than indoors and these can be helpful as well.  Some depressions are very sensitive to light and improve greatly with light, either artificial or natural.  Help Others:  Paradoxically, the time you feel that you need the most help, is the time you benefit from helping others the most;  Volunteer, baby sit, help your neighbor repair something, help a disabled community member, cook.  Eat well:   A low fat diet will help avoid that tired feeling after meals than can accentuate depression.  Any diet that helps you lose 5 to 10 pounds will increase your energy. If you are a terrible eater, at least admit it enough to eat a high fiber cereal and take a vitamin.  Turn off the TV:  The news is depressing to everyone.  Almost none of it is your fault, nor can most of us do anything about most of the events.  Commercials make you eat more.   Watching TV takes the place of exercise which will NOT make you feel better.  If you have low energy, get up during commercials and walk once around the room  If drinking, Stop drinking:  If you need help let your doctor know.  Schedule pleasant activities:  Rent comedies, play games, cancel one stressful activity.  Learn jokes.  Seek out upbeat people.  Learn stress management:  Take a course in yoga or stress management.        Stress Relief: Relaxation  Focusing the mind helps provide stress relief. Taking 5 to 10 minutes to practice relaxation each day helps you feel more refreshed. The following exercises can be done almost anywhere. Try one or more until you find what works best for you.  Calm Your Mind  Find a quiet place where you won't be disturbed. Then try the following:  Sit comfortably. Take off your shoes. Turn off your cell phone and pager. Take a few deep breaths.  Focus your mind on one peaceful thought, image, or word. Then try to hold that thought for 5 minutes.  When other thoughts enter your mind, relax and refocus. Let  the invading thoughts fall away.  When you're done, stand up slowly and stretch your arms over your head. With practice, this exercise can help you feel restored.    Calm Your Body  With practice, you can use mental cues to tell your body how to feel.  Sit comfortably and clear your mind. A few deep breaths will help.  Mentally focus on your left hand and repeat to yourself, \"My left hand feels warm and heavy.\" Keep doing this until your hand does feel heavier and warmer.  Repeat the exercise using your right hand. Then focus on your arms, legs, and feet until your whole body feels relaxed.  When you're done, stand up slowly and stretch your arms overhead.    Visualization  Visualization is like taking a mental vacation. It frees your mind while keeping your body in a calm state. To get started, picture yourself feeling warm and relaxed. Choose a peaceful setting that appeals to you and fill in the details. If you imagine a tropical beach, listen to the waves on the shore. Feel the sun on your face. Dig your toes in the sand. By using the power of your mind, you can take a soothing break when you need to.  © 6451-6518 [facility], [address] [place] [address], [place] [address]. All rights reserved. This information is not intended as a substitute for professional medical care. Always follow your healthcare professional's instructions.          OMRON:  Blood Pressure Management:     Continue with DASH:  See below.      Sodium <1500 mg daily    Ensure that you get Vitamin D3 2000 international units daily and Calcium 500 mg twice daily (Low fat dairy 3 servings daily (1% or skim).    Monitor blood pressure WEEKLY.  Call office for appt the next day for any top  or above or bottom  or above.    Goal BP in office <130/<80.      Coricidin HBP, cold medicine you can take when you are watching your blood pressure.    Avoid decongestants, like Sudafed and regular Anti-inflammatory medicines like Aleve/Naprosyn,  Ibuprofen/Motrin, etc., as  they can raise your blood pressure.        DASH Eating Plan (Dietary Approaches to Stop Hypertension)   This guide has been prepared for your use by registered dietitians.  If you have questions or concerns, please call the nearest [place] facility to contact a dietitian.  Diet counseling is available to address your specific needs.     People with high blood pressure, and those in danger of getting high blood pressure, can benefit from the DASH (dietary approaches to stop hypertension) eating plan.  This plan follows   heart-healthy guidelines.  It limits unhealthy fats and sodium.  It focuses on foods with nutrients that can help lower blood pressure.  These include   potassium, calcium, magnesium, protein and fiber.   The DASH eating plan below promotes healthy eating and gradual weight loss.  It is based on:    1,600 calories for most women and inactive men    2,000 calories for most men and very active women    It is recommended most people reduce sodium to less than 1,500 milligrams (mg) per day.  Younger, healthy people without risk for hypertension should reduce to 2,300 mg.   Who needs 1,500 milligrams (mg)?    People age 51 and older    People with high blood pressure or hypertension    People with diabetes    People with chronic kidney disease     Americans     To plan meals and your grocery shopping list, use the following chart:   High blood pressure can be controlled or prevented by taking these steps:    Focus food choices on fruits, vegetables,   low-fat dairy, lean poultry, fish or meats, nuts and whole grains    Choose foods lower in salt; read food labels    Maintain a healthy weight    Be moderately active most days    If you drink alcohol, do so moderately    Stop smoking    If you have high blood pressure and are prescribed pills, take as directed     Food group            Daily servings   1,600 calories 2,000 calories            Serving sizes             Examples and notes    Grains and cereals  6  7 to 8  1 slice bread   1 oz. dry cereal   1/2 cup cooked rice, pasta or cereal  Whole-wheat bread, English muffin, leona bread, bagel, cereal, grits, oatmeal, unsalted crackers, unsalted pretzels and unsalted popcorn    Vegetables  3 to 4  4 to 5  1 cup raw leafy vegetables   1/2 cup cooked   vegetables   6 oz. low-sodium vegetable juice  Tomatoes, potatoes, carrots, green peas, squash, broccoli, turnip greens, collards, kale, spinach, artichokes and green beans                 Trilobed Flap Text: The defect edges were debeveled with a #15 scalpel blade.  Given the location of the defect and the proximity to free margins a trilobed flap was deemed most appropriate.  Using a sterile surgical marker, an appropriate trilobed flap drawn around the defect.    The area thus outlined was incised deep to adipose tissue with a #15 scalpel blade.  The skin margins were undermined to an appropriate distance in all directions utilizing iris scissors.

## 2025-03-13 NOTE — PROCEDURE: REPAIR NOTE
Physical Therapy Visit    Visit Type: Daily Treatment Note  Visit: 2  Referring Provider: Lolis Gallego MD  Medical Diagnosis (from order): M17.11 - Primary osteoarthritis of right knee     SUBJECTIVE                                                                                                               Patient states that she was sore after the last session.  The heel slides with the belt do help a lot.       OBJECTIVE                                                                                                                     Range of Motion (ROM)   (degrees unless noted; active unless noted; norms in ( ); negative=lacking to 0, positive=beyond 0)  Knee:   - Flexion (150):       Right:  102   Passive: 108   - Extension (0-10):       Right:  -5                Treatment     Therapeutic Exercise  Upright bike seat 3 rocking x 6 mins, at first she tried to go around and had significant pain at first  Knee flexion at steps 5-10 second holds x5  Hamstring stretch at steps 2x30 seconds  Anterior hip stretch at steps x10 second holds x10  Quad sets with towel under knee for improved activation x5 with 5 second holds  Quad sets sitting and looking a contraction, improved activation verse supine x10 with 5 second holds  Heel slides x10 AROM; x5 AAROM     Manual Therapy   Sitting EOB with tibiofemoral distraction and PROM knee flexion  Supine tibiofemoral anterior/posterior mobilizations for knee extension     Gait Training  Throughout session cued for heel to toe pattern   Ambulation at bar forward and backwards walking cued for TKE and heel to toe pattern  Ambulation following this and cued for TKE, improved overall gait mechanics        ASSESSMENT                                                                                                            Patient had great improvement with overall knee flexion motion compared to initial session.  Continued to have overall limitations into knee extension but  following gait training with forward and retro walking she demonstrated improved TKE in stance phase of gait compared to start of session.  Felt good after session with reports that the knee did feel straighter.  Will continue to progress with overall knee motion and gait mechanics.     PLAN                                                                                                                           Suggestions for next session as indicated: Progress per plan of care, bike, continue with knee motion, gait training, standing TKE, squats with TKE, wobble board, tandem balance,        Therapy procedure time and total treatment time can be found documented on the Time Entry flowsheet     Melolabial Transposition Flap Text: The defect edges were debeveled with a #15 scalpel blade.  Given the location of the defect and the proximity to free margins a melolabial flap was deemed most appropriate.  Using a sterile surgical marker, an appropriate melolabial transposition flap was drawn incorporating the defect.    The area thus outlined was incised deep to adipose tissue with a #15 scalpel blade.  The skin margins were undermined to an appropriate distance in all directions utilizing iris scissors.